# Patient Record
Sex: FEMALE | Race: WHITE
[De-identification: names, ages, dates, MRNs, and addresses within clinical notes are randomized per-mention and may not be internally consistent; named-entity substitution may affect disease eponyms.]

---

## 2020-09-08 NOTE — NUR
RN ROUNDED TO HELP W/ BREASTFEEDING. INSTRUCT/DEMO WIDENING LATCH, CORRECT
POSITIONING, NIPPLE SHAPE AFTER FEEDS AND HAND EXPRESSION OF COLOSTRUM.
INSTRUCTED PT TO OFFER NB THE BREAST EVERY 2-3 HOURS, IF NB DOES NOT LATCH TO
HAND EXPRESS COLOSTRUM/MILK INTO NB MOUTH. INSTRUCTED ON WHAT TO EXEPCT DURING
THE FIRST WEEK W/ BREASTFEEDING AND CHANGES IN THE NB FEEDING PATTERN. FURTHER
LACTATION SUPPORT OFFERED IF PT DESIRES.

## 2023-02-01 ENCOUNTER — HOSPITAL ENCOUNTER (OUTPATIENT)
Dept: HOSPITAL 95 - LAB | Age: 28
Discharge: HOME | End: 2023-02-01
Attending: FAMILY MEDICINE
Payer: COMMERCIAL

## 2023-02-01 DIAGNOSIS — L02.415: Primary | ICD-10-CM

## 2023-02-20 ENCOUNTER — HOSPITAL ENCOUNTER (INPATIENT)
Dept: HOSPITAL 95 - BC | Age: 28
LOS: 2 days | Discharge: HOME | End: 2023-02-22
Attending: ADVANCED PRACTICE MIDWIFE | Admitting: ADVANCED PRACTICE MIDWIFE
Payer: COMMERCIAL

## 2023-02-20 VITALS — BODY MASS INDEX: 33.48 KG/M2 | WEIGHT: 220.9 LBS | HEIGHT: 68 IN

## 2023-02-20 DIAGNOSIS — O48.0: Primary | ICD-10-CM

## 2023-02-20 DIAGNOSIS — Z3A.40: ICD-10-CM

## 2023-02-20 DIAGNOSIS — F17.210: ICD-10-CM

## 2023-02-20 DIAGNOSIS — Z88.0: ICD-10-CM

## 2023-02-20 LAB
DEPRECATED RDW RBC AUTO: 46.6 FL (ref 35.1–46.3)
ERYTHROCYTE [DISTWIDTH] IN BLOOD BY AUTOMATED COUNT: 14.6 % (ref 11.7–14.2)
HCT VFR BLD AUTO: 38.2 % (ref 33–51)
HGB BLD-MCNC: 13.6 G/DL (ref 11.5–16)
MCHC RBC AUTO-ENTMCNC: 35.6 G/DL (ref 31.5–36.5)
MCV RBC AUTO: 89 FL (ref 80–100)
NRBC # BLD AUTO: 0 K/MM3 (ref 0–0.02)
NRBC BLD AUTO-RTO: 0 /100 WBC (ref 0–0.2)
PLATELET # BLD AUTO: 292 K/MM3 (ref 150–400)

## 2023-02-20 PROCEDURE — A9270 NON-COVERED ITEM OR SERVICE: HCPCS

## 2023-02-21 LAB
BASOPHILS # BLD: 0 K/MM3 (ref 0–0.23)
BASOPHILS NFR BLD: 0 % (ref 0–2)
EOSINOPHIL # BLD: 0 K/MM3 (ref 0–0.68)
EOSINOPHIL NFR BLD: 0 % (ref 0–6)
LYMPHOCYTES # BLD: 3.89 K/MM3 (ref 0.84–5.2)
LYMPHOCYTES NFR BLD: 23 % (ref 21–46)
MONOCYTES # BLD: 0.48 K/MM3 (ref 0.16–1.47)
MONOCYTES NFR BLD: 3 % (ref 4–13)
NEUTS BAND NFR BLD MANUAL: 2 % (ref 0–8)
NEUTS SEG # BLD MANUAL: 11.83 K/MM3 (ref 1.96–9.15)
NEUTS SEG NFR BLD MANUAL: 71 % (ref 41–73)
TOTAL CELLS COUNTED BLD: 100
VARIANT LYMPHS NFR BLD MANUAL: 1 % (ref 0–0)

## 2023-02-21 PROCEDURE — 3E0R3BZ INTRODUCTION OF ANESTHETIC AGENT INTO SPINAL CANAL, PERCUTANEOUS APPROACH: ICD-10-PCS | Performed by: ADVANCED PRACTICE MIDWIFE

## 2023-02-21 PROCEDURE — 00HU33Z INSERTION OF INFUSION DEVICE INTO SPINAL CANAL, PERCUTANEOUS APPROACH: ICD-10-PCS | Performed by: ADVANCED PRACTICE MIDWIFE
